# Patient Record
Sex: MALE | Race: BLACK OR AFRICAN AMERICAN | NOT HISPANIC OR LATINO
[De-identification: names, ages, dates, MRNs, and addresses within clinical notes are randomized per-mention and may not be internally consistent; named-entity substitution may affect disease eponyms.]

---

## 2023-01-10 ENCOUNTER — APPOINTMENT (OUTPATIENT)
Dept: UROLOGY | Facility: CLINIC | Age: 58
End: 2023-01-10
Payer: COMMERCIAL

## 2023-01-10 VITALS
TEMPERATURE: 97.8 F | HEIGHT: 68 IN | BODY MASS INDEX: 28.49 KG/M2 | WEIGHT: 188 LBS | RESPIRATION RATE: 16 BRPM | OXYGEN SATURATION: 97 % | HEART RATE: 68 BPM | DIASTOLIC BLOOD PRESSURE: 83 MMHG | SYSTOLIC BLOOD PRESSURE: 150 MMHG

## 2023-01-10 DIAGNOSIS — Z12.5 ENCOUNTER FOR SCREENING FOR MALIGNANT NEOPLASM OF PROSTATE: ICD-10-CM

## 2023-01-10 PROCEDURE — 51798 US URINE CAPACITY MEASURE: CPT

## 2023-01-10 PROCEDURE — 99204 OFFICE O/P NEW MOD 45 MIN: CPT

## 2023-01-10 NOTE — LETTER BODY
[Dear  ___] : Dear  [unfilled], [Courtesy Letter:] : I had the pleasure of seeing your patient, [unfilled], in my office today. [Please see my note below.] : Please see my note below. [Consult Closing:] : Thank you very much for allowing me to participate in the care of this patient.  If you have any questions, please do not hesitate to contact me. [Sincerely,] : Sincerely, [FreeTextEntry3] : Jewell Monique MD\par Director of Robotic Education\par [facility]\par \par [email or web address]\par [phone] ([place])\par [phone]  ([facility])

## 2023-01-10 NOTE — HISTORY OF PRESENT ILLNESS
[FreeTextEntry1] : Patient Name: Howard Heath\par Date of Birth: [birth year]\par Contact Number: [phone]\par ------------------------------------------------------------------------------\par Date of Initial Visit: 1/10/23\par Referring Provider/PCP: Dr. Tera Antonio\par ------------------------------------------------------------------------------\par \par CC: nocturia\par \par HPI: 57 year old with nocturia. Patient reports over the past year or so had onset of nocturia, gradually increased to 3x/night. Usually in second half of the night. Patient reports his sleep has been poor lately. The initial void of the night will wake him from sleep, but then difficult for him to go back to sleep in general and has multiple other voids. Patient reports heavy snoring. He is being worked-up for a sleep study. No major cardiac issues, just high blood pressure. Patient reports drinks a lot of juice and does this up until bedtime. \par \par During the day patient denies any issues. No frequency or urgency. Good stream. No straining. Feels like empties bladder completely. \par \par Patient reports his PCP did an ultrasound at his PCP last year and was told "seed" in his kidney, and was not concerned.\par \par Patient is unsure if he has had PSA screening in the past. Patient reports family history of prostate cancer - multiple uncles with prostate cancer - treated with surgery, at least one still alive, unsure if other  from unrelated causes.\par \par No issues with erections.\par \par IPSS 3 (nocturia) QOL 3\par CAMI 21\par \par PVR 87\par \par PMH: HTN\par PSH: inguinal hernia repair with mesh (open, at age 23), ACL repair, cyst removal\par Family History: prostate cancer in uncles\par Social: , never smoker tobacco, marijuana in the past and occasional use, alcohol 6 pack on weekend, + cocaine use on occasion\par Allergies: NKDA\par ROS: no fevers or chills, no weight loss

## 2023-01-10 NOTE — PHYSICAL EXAM
[General Appearance - Well Developed] : well developed [General Appearance - Well Nourished] : well nourished [Normal Appearance] : normal appearance [Well Groomed] : well groomed [General Appearance - In No Acute Distress] : no acute distress [Edema] : no peripheral edema [] : no respiratory distress [Respiration, Rhythm And Depth] : normal respiratory rhythm and effort [Exaggerated Use Of Accessory Muscles For Inspiration] : no accessory muscle use [Abdomen Soft] : soft [Abdomen Tenderness] : non-tender [Costovertebral Angle Tenderness] : no ~M costovertebral angle tenderness [Urethral Meatus] : meatus normal [Penis Abnormality] : normal uncircumcised penis [Urinary Bladder Findings] : the bladder was normal on palpation [Testes Tenderness] : no tenderness of the testes [Rectal Exam - Rectum] : digital rectal exam was normal [Prostate Tenderness] : the prostate was not tender [No Prostate Nodules] : no prostate nodules [Prostate Size ___ (0-4)] : prostate size [unfilled] (scale: 0-4) [Normal Station and Gait] : the gait and station were normal for the patient's age

## 2023-01-12 ENCOUNTER — NON-APPOINTMENT (OUTPATIENT)
Age: 58
End: 2023-01-12

## 2023-01-13 LAB
APPEARANCE: CLEAR
BACTERIA UR CULT: NORMAL
BACTERIA: NEGATIVE
BILIRUBIN URINE: NEGATIVE
BLOOD URINE: NEGATIVE
COLOR: NORMAL
GLUCOSE QUALITATIVE U: NEGATIVE
HYALINE CASTS: 0 /LPF
KETONES URINE: NEGATIVE
LEUKOCYTE ESTERASE URINE: NEGATIVE
MICROSCOPIC-UA: NORMAL
NITRITE URINE: NEGATIVE
PH URINE: 6.5
PROTEIN URINE: NEGATIVE
PSA FREE FLD-MCNC: 30 %
PSA FREE SERPL-MCNC: 0.43 NG/ML
PSA SERPL-MCNC: 1.43 NG/ML
RED BLOOD CELLS URINE: 2 /HPF
SPECIFIC GRAVITY URINE: 1.01
SQUAMOUS EPITHELIAL CELLS: 0 /HPF
UROBILINOGEN URINE: NORMAL
WHITE BLOOD CELLS URINE: 0 /HPF

## 2023-02-07 ENCOUNTER — APPOINTMENT (OUTPATIENT)
Dept: UROLOGY | Facility: CLINIC | Age: 58
End: 2023-02-07
Payer: COMMERCIAL

## 2023-02-07 PROCEDURE — 99442: CPT

## 2023-02-07 NOTE — HISTORY OF PRESENT ILLNESS
[Medical Office: ([facility])___] : at the medical office located in  [Verbal consent obtained from patient] : the patient, [unfilled] [FreeTextEntry1] : Patient Name: Howard Heath\par Date of Birth: [birth year]\par Contact Number: [phone]\par ------------------------------------------------------------------------------\par Date of Initial Visit: 1/10/23\par Referring Provider/PCP: Dr. Tera Antonio\par ------------------------------------------------------------------------------\par \par Initial HPI 23:\par \par CC: nocturia\par \par HPI: 57 year old with nocturia. Patient reports over the past year or so had onset of nocturia, gradually increased to 3x/night. Usually in second half of the night. Patient reports his sleep has been poor lately. The initial void of the night will wake him from sleep, but then difficult for him to go back to sleep in general and has multiple other voids. Patient reports heavy snoring. He is being worked-up for a sleep study. No major cardiac issues, just high blood pressure. Patient reports drinks a lot of juice and does this up until bedtime. \par \par During the day patient denies any issues. No frequency or urgency. Good stream. No straining. Feels like empties bladder completely. \par \par Patient reports his PCP did an ultrasound at his PCP last year and was told "seed" in his kidney, and was not concerned.\par \par Patient is unsure if he has had PSA screening in the past. Patient reports family history of prostate cancer - multiple uncles with prostate cancer - treated with surgery, at least one still alive, unsure if other  from unrelated causes.\par \par No issues with erections.\par \par IPSS 3 (nocturia) QOL 3\par CAMI 21\par \par PVR 87\par ------------------------------------------------------------------------------\par \par Interval History 23:\par \par Renal US 3/26/21: right kidney 6.4mm non obstructing right renal calculus. No masses, hydronephrosis, or cysts bilaterally.\par \par PSA 3/17/22: 1.0\par \par Labs from visit:\par UA wnl, urine culture no growth, PSA 1.4\par \par Underwent repeat renal US 23: non obstructing right mid renal calculi 0.8cm. PVR 38. Prostate 42cc. Bilateral ureteral jets.\par \par Patient reports he is asymptomatic, no N/v, fevers, chills, flank pain.\par \par \par ------------------------------------------------------------------------------\par PMH: HTN\par PSH: inguinal hernia repair with mesh (open, at age 23), ACL repair, cyst removal\par Family History: prostate cancer in uncles\par Social: , never smoker tobacco, marijuana in the past and occasional use, alcohol 6 pack on weekend, + cocaine use on occasion\par Allergies: NKDA\par ROS: no fevers or chills, no flank pain, no N/V\par \par

## 2023-02-07 NOTE — ASSESSMENT
[FreeTextEntry1] : 57 year old with 8mm right mid renal calculi. Non-obstructing. We discussed observation and intervention,a nd the risks and benefits of each approach. We discussed that while the stone is not currently obstructing, given size and location, may become obstructing and low likelihood of passage with 8mm stone. We discussed proceed with non-con CT to better delineate size and location to aid in decision making. Will plan for follow-up in person after imaging to discuss next steps. Patient going on vacation next week, will follow-up after. Given signs and symptoms for which to seek emergency attention. Patient in agreement with plan.\par \par - Ct stone hunt\par - f/u after imaging

## 2023-03-29 ENCOUNTER — APPOINTMENT (OUTPATIENT)
Dept: UROLOGY | Facility: CLINIC | Age: 58
End: 2023-03-29
Payer: COMMERCIAL

## 2023-03-29 VITALS — DIASTOLIC BLOOD PRESSURE: 77 MMHG | SYSTOLIC BLOOD PRESSURE: 128 MMHG | TEMPERATURE: 98 F | HEART RATE: 80 BPM

## 2023-03-29 DIAGNOSIS — N28.1 CYST OF KIDNEY, ACQUIRED: ICD-10-CM

## 2023-03-29 PROCEDURE — 51798 US URINE CAPACITY MEASURE: CPT

## 2023-03-29 PROCEDURE — 99215 OFFICE O/P EST HI 40 MIN: CPT

## 2023-03-29 NOTE — LETTER BODY
[Dear  ___] : Dear  [unfilled], [Courtesy Letter:] : I had the pleasure of seeing your patient, [unfilled], in my office today. [Please see my note below.] : Please see my note below. [Consult Closing:] : Thank you very much for allowing me to participate in the care of this patient.  If you have any questions, please do not hesitate to contact me. [Sincerely,] : Sincerely, [FreeTextEntry3] : Jewell Monique MD\par Director of Robotic Education\par [facility] at [facility]\par \par [email or web address]\par [phone] ([place])\par [phone]  ([facility])

## 2023-03-29 NOTE — PHYSICAL EXAM
[General Appearance - Well Developed] : well developed [General Appearance - Well Nourished] : well nourished [Normal Appearance] : normal appearance [Well Groomed] : well groomed [General Appearance - In No Acute Distress] : no acute distress [Abdomen Soft] : soft [Abdomen Tenderness] : non-tender [Costovertebral Angle Tenderness] : no ~M costovertebral angle tenderness [Edema] : no peripheral edema [] : no respiratory distress [Respiration, Rhythm And Depth] : normal respiratory rhythm and effort [Exaggerated Use Of Accessory Muscles For Inspiration] : no accessory muscle use [Normal Station and Gait] : the gait and station were normal for the patient's age

## 2023-03-29 NOTE — HISTORY OF PRESENT ILLNESS
[FreeTextEntry1] : Patient Name: Howard Heath\par Date of Birth: [birth year]\par Contact Number: [phone]\par ------------------------------------------------------------------------------\par Date of Initial Visit: 1/10/23\par Referring Provider/PCP: Dr. Tera Antonio (f. [phone])\par ------------------------------------------------------------------------------\par \par Initial HPI 23:\par \par CC: nocturia\par \par HPI: 57 year old with nocturia. Patient reports over the past year or so had onset of nocturia, gradually increased to 3x/night. Usually in second half of the night. Patient reports his sleep has been poor lately. The initial void of the night will wake him from sleep, but then difficult for him to go back to sleep in general and has multiple other voids. Patient reports heavy snoring. He is being worked-up for a sleep study. No major cardiac issues, just high blood pressure. Patient reports drinks a lot of juice and does this up until bedtime. \par \par During the day patient denies any issues. No frequency or urgency. Good stream. No straining. Feels like empties bladder completely. \par \par Patient reports his PCP did an ultrasound at his PCP last year and was told "seed" in his kidney, and was not concerned.\par \par Patient is unsure if he has had PSA screening in the past. Patient reports family history of prostate cancer - multiple uncles with prostate cancer - treated with surgery, at least one still alive, unsure if other  from unrelated causes.\par \par No issues with erections.\par \par IPSS 3 (nocturia) QOL 3\par CAMI 21\par \par PVR 87\par ------------------------------------------------------------------------------\par \par Interval History 23:\par \par Renal US 3/26/21: right kidney 6.4mm non obstructing right renal calculus. No masses, hydronephrosis, or cysts bilaterally.\par \par PSA 3/17/22: 1.0\par \par Labs from visit:\par UA wnl, urine culture no growth, PSA 1.4\par \par Underwent repeat renal US 23: non obstructing right mid renal calculi 0.8cm. PVR 38. Prostate 42cc. Bilateral ureteral jets.\par \par Patient reports he is asymptomatic, no N/v, fevers, chills, flank pain.\par ------------------------------------------------------------------------------\par \par Interval History 3/29/23:\par \par Non-con CT 3/20/23: 1. Several nonobstructing calculi in the right kidney measuring up to 5 mm and 2 nonobstructing calculi in the left kidney measuring up to 3 mm.\par 2. Cyst measuring 8 mm in the mid left kidney containing few thick septations. For further evaluation, MR examination of abdomen with and without intravenous gadolinium may be helpful.\par 3. Mildly enlarged prostate gland. - 39g\par \par Nocturia somewhat improved given patient reports he is on furlough and his patterns have changed, but he has not limited fluid much and has not done MILLER work-up yet, but will do after his vacation.\par \par IPSS 2, mixed\par CAMI 16\par PVR 91 - random bladder scan, patient voided earlier in AM and did not need to void\par Uroflow - patient voided prior, will do at next visit\par \par Imaging:\par CT non-con [facility] 3/20/23: KIDNEYS: The kidneys are normal in size, shape, and position.  There are no renal calculi or hydronephrosis.\par \par There is a 5 x 4 mm nonobstructing calculus in the mid right kidney. Density measurement corresponds to 747 Hounsfield units.\par There is also a 4 x 4 mm nonobstructing calculus in mid right kidney and 2 adjacent 2 mm nonobstructing calculi in the upper right kidney.\par There is 2 mm nonobstructing calculus in the upper left kidney and 3 mm nonobstructing calculus in the lower left kidney.\par \par There is 8mm low-attenuation lesion in the mid left kidney containing few thick septations. For further evaluation, MR examination of the abdomen with and without intravenous gadolinium may be helpful.\par \par URETERS: Unremarkable.\par \par URINARY BLADDER: Unremarkable.\par \par REPRODUCTIVE ORGANS: The prostate gland measures 4.9 x 3.9 x 4.2 cm corresponding to 39 g.\par \par ------------------------------------------------------------------------------\par PMH: HTN\par PSH: inguinal hernia repair with mesh (open, at age 23), ACL repair, cyst removal\par Family History: prostate cancer in uncles\par Social: , never smoker tobacco, marijuana in the past and occasional use, alcohol 6 pack on weekend, + cocaine use on occasion\par Allergies: NKDA\par ROS: no fevers or chills, no flank pain, no N/V

## 2023-03-29 NOTE — ASSESSMENT
[FreeTextEntry1] : 57 year old with  nocturia, nephrolithiasis, and renal cyst. \par \par LUTS/nocturia: No significant daytime LUTS. Random bladder scan today 90 (patient voided prior to coming in). Patient does reports significant snoring and undergoing work-up for MILLER with PCP, says will do after vacation. Discussed role of MILLER in nocturia. Patient also reports drinks most of his fluids in PM and before bed. Discussed abstaining from fluids 3-4 prior to bedtime as well, which patient reports has not really done. With regard to his PVR, discussed prostatic component to his nocturia and considering initiation of Flomax, however, patient would like to hold off at this time.\par \par Nephrolithiasis: right kidney has 5mm stone mid pole, 4mm stone midpole, 2 2mm stones upper pole, all non-obstructing; left kidney 2mm upper pole and 3mm lower pole. Assessment:  \par \par We discussed the management of kidney stones with the patient. There are several options, including surveillance (no treatment), shock wave lithotripsy, ureteroscopy, and percutaneous stone removal. \par \par Surveillance:  \par We can continue to watch the stone(s) over the next year or more. However, I explained that there is always a risk that the stone could get bigger in size or become symptomatic (pain, bleeding, urinary tract infections, kidney dysfunction from obstruction). While there is limited data examining which patients will eventually need treatment for stones that are asymptomatic, some studies suggest that 20-50% of patients will eventually seek treatment or pass a stone within 5 years. That percentage increases as the size of the stone increases. \par \par Shock Wave Lithotripsy (SWL):  \par This is the least invasive form of surgery for stones and an excellent option for select stones. For ureteral stones, SWL is limited to the upper ureter. I explained how the procedure is performed and the concept behind shock waves. Procedural success is dependent on several stone and environmental factors such as the stone composition or density on CT, the presence or absence of hydronephrosis, size of the stone, stone location, and skin-to-stone distance on CT scan (patient’s body habitus). For these reasons, not all stones/patients are good candidates for SWL. The chances of being stone free after SWL are often much lower compared to other modalities, such as ureteroscopy, except in select cases where stone-free rates are comparable. Therefore, there is a risk that the patient would need subsequent procedures to render them stone-free. Since this is a non-invasive procedure, we are relying on the kidney to spontaneously pass the resultant stone fragments. At the same time, this procedure does carry some perioperative risks, mainly bleeding and infection, as well as the small risk of developing obstruction due to passage of stone fragments, which could require urgent placement of a double-J ureteral stent or nephrostomy tube. \par \par Ureteroscopy:  \par I explained the technique in detail and how it is performed. Complete stone free rates (no residual fragments of any size) approach 90% for ureteral stones and likely range from 50-60% for renal stones. Very commonly, a ureteral stent is left in place at the conclusion of the procedure, but only if needed. I explained that if a stent is placed, it would need to be removed either cystoscopically under local anesthesia or it may have a string left externally through the urethra for removal in a few days after the procedure. Risks of ureteroscopy include, but are not limited to, bleeding, infection, injury to the bladder or ureter, ureteral perforation, ureteral stricture, residual fragments leading to subsequent symptoms or secondary procedures, and other risks involved with general anesthesia. There is also the risk that the procedure needs to be staged into more than one session based on the patient's internal anatomy and the size of the stone(s). Finally, dilation of the ureter and/or ureteral stent placement prior to definitive ureteroscopy may be necessary to achieve ureteral access safely in up to 5% of patients, particularly those who have not been previously instrumented. \par \par Percutaneous nephrolithotomy (PCNL):  \par PCNL is generally reserved for large and/or complex stones, including staghorn calculi, lower pole renal stones > 1 cm, or stones in complex renal anatomy. It has the highest rate of stone clearance but is more invasive. It requires an external access site directly into the kidney through the back in order to remove the stones. I generally perform this surgery in one step in the operating room (the patient will have their kidney access obtained at the same time as the stone removal), thus avoiding the need for multiple procedures (e.g., one procedure to have the kidney access tube placed by interventional radiology and a second procedure in the operating room for the stone removal). Most patients have a ureteral stent following the procedure, which is typically removed in the office at follow up in 1-2 weeks. Alternatively, some patients will be left with a nephrostomy tube (external tube into the kidney). Due to its more invasive nature, PCNL does have higher risks than ureteroscopy and SWL. Specific risks include bleeding requiring transfusion, fevers, sepsis, and major injury to surrounding organs (pleura or bowel). Finally, multiple procedures and/or staged ureteroscopy with laser lithotripsy after PCNL may be required to clear all stone fragments. \par \par \par Renal cyst: 8mm lesion mid left kidney with thick septations: plan for MR urogram to better evaluate\par \par \par PSA screening: \par Patient also has family history of prostate cancer and is . PSA 1.43 1/12/2023. Recheck 1/2024\par \par \par - MR renal mass protocol\par - MILLER testing underway with PCP\par - monitor PVR, wants to hold off on initiating Flomax for now, Uroflow and PVR next visit\par - PSA 1/2024\par - stone management: patient would like to think about options and discuss with his wife. He will return after MRI and we will discuss imaging and patient will decide on management at that time. In interim we discuss increasing water intake, low salt diet. We discussed warning signs for which he should seek emergency attention (and let me know) including fevers, chills, N/V/inability to tolerate po, intractable pain\par - f/u after MRI\par \par \par  \par

## 2023-06-01 ENCOUNTER — TRANSCRIPTION ENCOUNTER (OUTPATIENT)
Age: 58
End: 2023-06-01

## 2023-06-13 ENCOUNTER — APPOINTMENT (OUTPATIENT)
Dept: UROLOGY | Facility: CLINIC | Age: 58
End: 2023-06-13
Payer: COMMERCIAL

## 2023-06-13 VITALS
RESPIRATION RATE: 16 BRPM | WEIGHT: 188 LBS | HEIGHT: 68 IN | HEART RATE: 62 BPM | SYSTOLIC BLOOD PRESSURE: 145 MMHG | BODY MASS INDEX: 28.49 KG/M2 | OXYGEN SATURATION: 98 % | DIASTOLIC BLOOD PRESSURE: 87 MMHG

## 2023-06-13 PROCEDURE — 99214 OFFICE O/P EST MOD 30 MIN: CPT

## 2023-06-13 PROCEDURE — 51736 URINE FLOW MEASUREMENT: CPT

## 2023-06-13 PROCEDURE — 51798 US URINE CAPACITY MEASURE: CPT

## 2023-06-13 NOTE — PHYSICAL EXAM
[General Appearance - Well Developed] : well developed [General Appearance - Well Nourished] : well nourished [Normal Appearance] : normal appearance [Well Groomed] : well groomed [General Appearance - In No Acute Distress] : no acute distress [Abdomen Soft] : soft [Abdomen Tenderness] : non-tender [Costovertebral Angle Tenderness] : no ~M costovertebral angle tenderness [FreeTextEntry1] : no evidence significant hernia on exam

## 2023-06-13 NOTE — ASSESSMENT
[FreeTextEntry1] : 57 year old with nocturia, nephrolithiasis, and renal cyst. \par \par LUTS/nocturia: No significant daytime LUTS. Patient does reports significant snoring and undergoing work-up for MILLER but has still not done. Discussed role of MILLER in nocturia. Patient also reports drinks most of his fluids in PM and before bed. Discussed abstaining from fluids 3-4 prior to bedtime as well, which patient reports has helped when he does. PVR 0 today. Will continue conservative measures and plan for MILLER eval.\par \par Nephrolithiasis: right kidney has 5mm stone mid pole, 4mm stone midpole, 2 2mm stones upper pole, all non-obstructing; left kidney 2mm upper pole and 3mm lower pole. We discussed the management of kidney stones with the patient. There are several options, including surveillance (no treatment), shock wave lithotripsy, ureteroscopy, and percutaneous stone removal. We discussed detail of each approach at last visit and again today. Patient would like to monitor for now. I explained that there is always a risk that the stone could get bigger in size or become symptomatic (pain, bleeding, urinary tract infections, kidney dysfunction from obstruction). While there is limited data examining which patients will eventually need treatment for stones that are asymptomatic, some studies suggest that 20-50% of patients will eventually seek treatment or pass a stone within 5 years. That percentage increases as the size of the stone increases. \par \par Renal cyst: 8mm lesion mid left kidney with thick septations. Underwent MR for better evaluation: Bilateral renal cysts. The cyst noted on the CT examination represents a simple cyst. No follow-up needed.\par \par PSA screening: \par Patient also has family history of prostate cancer and is . PSA 1.43 1/12/2023. Recheck 1/2024\par \par \par - MILLER testing - patient has not followed up with pulm yet, but will do\par - PSA 1/2024\par - stone management: patient would like to monitor for now. In interim we discuss increasing water intake, low salt diet. We discussed warning signs for which he should seek emergency attention (and let me know) including fevers, chills, N/V/inability to tolerate po, intractable pain; not interested in 24 hour urine at this time\par - will f/u PCP re hemangioma - gave patient copy of report and faxed results to PCP\par - no further imaging re renal cyst\par - surveillance US for stones Sept/Oct\par - gave names for general surgeon to evaluate for hernia

## 2023-06-13 NOTE — LETTER BODY
[Dear  ___] : Dear  [unfilled], [Courtesy Letter:] : I had the pleasure of seeing your patient, [unfilled], in my office today. [Please see my note below.] : Please see my note below. [Consult Closing:] : Thank you very much for allowing me to participate in the care of this patient.  If you have any questions, please do not hesitate to contact me. [Sincerely,] : Sincerely, [FreeTextEntry3] : Jewell Monique MD\par Director of Robotic Education\par The [facility] at [facility]\par \par [email or web address]\par [phone] ([place])\par [phone]  ([facility])

## 2023-06-13 NOTE — HISTORY OF PRESENT ILLNESS
[FreeTextEntry1] : Patient Name: Howard Heath\par Date of Birth: [birth year]\par Contact Number: [phone]\par ------------------------------------------------------------------------------\par Date of Initial Visit: 1/10/23\par Referring Provider/PCP: Dr. Tera Antonio (f. [phone])\par ------------------------------------------------------------------------------\par \par Initial HPI 23:\par \par CC: nocturia\par \par HPI: 57 year old with nocturia. Patient reports over the past year or so had onset of nocturia, gradually increased to 3x/night. Usually in second half of the night. Patient reports his sleep has been poor lately. The initial void of the night will wake him from sleep, but then difficult for him to go back to sleep in general and has multiple other voids. Patient reports heavy snoring. He is being worked-up for a sleep study. No major cardiac issues, just high blood pressure. Patient reports drinks a lot of juice and does this up until bedtime. \par \par During the day patient denies any issues. No frequency or urgency. Good stream. No straining. Feels like empties bladder completely. \par \par Patient reports his PCP did an ultrasound at his PCP last year and was told "seed" in his kidney, and was not concerned.\par \par Patient is unsure if he has had PSA screening in the past. Patient reports family history of prostate cancer - multiple uncles with prostate cancer - treated with surgery, at least one still alive, unsure if other  from unrelated causes.\par \par No issues with erections.\par \par IPSS 3 (nocturia) QOL 3\par CAMI 21\par \par PVR 87\par ------------------------------------------------------------------------------\par \par Interval History 23:\par \par Renal US 3/26/21: right kidney 6.4mm non obstructing right renal calculus. No masses, hydronephrosis, or cysts bilaterally.\par \par PSA 3/17/22: 1.0\par \par Labs from visit:\par UA wnl, urine culture no growth, PSA 1.4\par \par Underwent repeat renal US 23: non obstructing right mid renal calculi 0.8cm. PVR 38. Prostate 42cc. Bilateral ureteral jets.\par \par Patient reports he is asymptomatic, no N/v, fevers, chills, flank pain.\par ------------------------------------------------------------------------------\par \par Interval History 3/29/23:\par \par Non-con CT 3/20/23: 1. Several nonobstructing calculi in the right kidney measuring up to 5 mm and 2 nonobstructing calculi in the left kidney measuring up to 3 mm.\par 2. Cyst measuring 8 mm in the mid left kidney containing few thick septations. For further evaluation, MR examination of abdomen with and without intravenous gadolinium may be helpful.\par 3. Mildly enlarged prostate gland. - 39g\par \par Nocturia somewhat improved given patient reports he is on furlough and his patterns have changed, but he has not limited fluid much and has not done MILLER work-up yet, but will do after his vacation.\par \par IPSS 2, mixed\par CAMI 16\par PVR 91 - random bladder scan, patient voided earlier in AM and did not need to void\par Uroflow - patient voided prior, will do at next visit\par \par Imaging:\par CT non-con [facility] 3/20/23: KIDNEYS: The kidneys are normal in size, shape, and position. There are no renal calculi or hydronephrosis.\par \par There is a 5 x 4 mm nonobstructing calculus in the mid right kidney. Density measurement corresponds to 747 Hounsfield units.\par There is also a 4 x 4 mm nonobstructing calculus in mid right kidney and 2 adjacent 2 mm nonobstructing calculi in the upper right kidney.\par There is 2 mm nonobstructing calculus in the upper left kidney and 3 mm nonobstructing calculus in the lower left kidney.\par \par There is 8mm low-attenuation lesion in the mid left kidney containing few thick septations. For further evaluation, MR examination of the abdomen with and without intravenous gadolinium may be helpful.\par \par URETERS: Unremarkable.\par \par URINARY BLADDER: Unremarkable.\par \par REPRODUCTIVE ORGANS: The prostate gland measures 4.9 x 3.9 x 4.2 cm corresponding to 39 g.\par ----------------------------------------------------------------------------------------------------------------------------------------\par Interval History (2023):\par \par Nocturia: Patient reports got better when withheld fluid but when doesn’t goes up to 3x, not overly bothersome. Daytime urination not an issue, denies any straining. Feels like emptying bladder. Patient was supposed to have MILLER eval with pulmonology but has not followed up.\par \par Underwent MRI for better eval of renal cyst: 1.  Bilateral renal cysts. The cyst noted on the CT examination represents a simple cyst. No follow-up needed. 2.  Small left hepatic hemangioma. (full read below).\par \par Also reports strain in left inguinal region, started after lifting weights.\par \par IPSS 9, QOL 3\par CAMI 16\par Uroflow Qmax 8.7, but only voided 102.6ml, good curve\par PVR 0\par \par EXAM:  MRI ABDOMEN WITHOUT AND WITH CONTRAST 23\par \par FINDINGS: LOWER CHEST: The heart size is normal. There is no pleural effusion.\par \par LIVER: The liver is normal in size. No significant fatty infiltration is noted. There is a 1.2 x 0.7 cm focal lesion in segment 2/4A showing low signal intensity on T1-weighted images, high signal intensity on T2-weighted images with peripheral nodular centripetal enhancement diagnostic of a hemangioma. The hepatic and portal veins are patent.\par \par GALLBLADDER/BILIARY TREE: There is no biliary tree dilatation. The gallbladder is of normal size without wall thickening, pericholecystic fluid or large stones.\par \par PANCREAS: There is no mass or pancreatic ductal dilatation. \par \par SPLEEN: The spleen is normal size and signal intensities. There is no mass.  \par \par ADRENAL GLANDS: The adrenal glands are normal in size. There is no nodule. \par \par KIDNEYS/URETERS: The kidneys are normal in size and position. Arising from the medial aspect of the junction of the upper to mid-level of the left kidney is a simple cyst measuring 1 cm corresponding to the cyst seen on the CT study. A few additional tiny cysts are scattered in both kidneys. There is no suspicious solid mass or hydronephrosis. Note that MRI has low sensitivity in detecting small stones. On the prior CT examination, there were bilateral renal stones. Please refer to the CT report.\par \par GI TRACT: There is no bowel dilatation or obstruction. \par \par BLOOD VESSELS: The aorta and IVC are normal in size.\par \par LYMPH NODES: There are no enlarged lymph nodes.  \par \par ASCITES: There is no ascites. \par \par BONES: Intact. There is mild dextroscoliosis of the lower thoracic spine.\par \par OTHER: A small umbilical hernia containing fat is present.\par \par IMPRESSION:\par \par 1.  Bilateral renal cysts. The cyst noted on the CT examination represents a simple cyst. No follow-up needed.\par 2.  Small left hepatic hemangioma.\par \par \par ---------------------------------------------------------------------------------------------------------------------------------------- \par \par PMH: HTN\par PSH: inguinal hernia repair with mesh (open, at age 23), ACL repair, cyst removal\par Family History: prostate cancer in uncles\par Social: , never smoker tobacco, marijuana in the past and occasional use, alcohol 6 pack on weekend, + cocaine use on occasion\par Allergies: NKDA\par ROS: no fevers or chills, no flank pain, no N/V \par ---------------------------------------------------------------------------------------------------------------------------------------- \par \par Labs:\par PSA Trend:\par 3/17/22: 1.0\par 23: 1.43

## 2023-07-10 NOTE — LETTER BODY
[de-identified] : GENERAL APPEARANCE OF PATIENT IS WELL DEVELOPED, WELL NOURISHED, BODY HABITUS NORMAL, WELL GROOMED, NO DEFORMITIES NOTED. \par Head - Atraumatic and Normocephalic \par Eyes, Nose, and Throat: External inspection of ears and nose are normal overall without scars, lesions, or masses noted. Assessment of hearing is normal\par Neck-Examination of neck shows no masses, overall appearance is normal, neck is symmetric, tracheal position is midline, no crepitus is noted. Examination of thyroid shows no enlargement, tenderness or masses\par Respiratory- Assessment of respiratory effort shows no intercostal retractions, no use of accessory muscles, unlabored breathing, and normal diaphragmatic movement.\par Cardiovascular- Examination of extremities show no edema or varicosities\par Musculoskeletal. Examination of gait is not antalgic and station is normal\par Inspection and palpation of digits and nails shows no clubbing, cyanosis, nodules, drainage, fluctuance, petechiae\par \par • Spine – inspection and palpation shows no misalignment, asymmetry, crepitation, defects, tenderness, masses, effusions. ROM is normal without crepitation or contracture. No instability or subluxation or laxity is noted. No abnormal movements.\par \par \par • Neck- inspection and palpation shows no misalignment, asymmetry, crepitation, defects, tenderness, masses, effusions. ROM is normal without crepitation or contracture. No instability or subluxation or laxity is noted. No abnormal movements.\par \par \par • RUE- inspection and palpation shows no misalignment, asymmetry, crepitation, defects, tenderness, masses, effusions. ROM is normal without crepitation or contracture. No instability or subluxation or laxity is noted. No abnormal movements.\par \par \par • LUE- inspection and palpation shows no misalignment, asymmetry, crepitation, defects, tenderness, masses, effusions. ROM is normal without crepitation or contracture. No instability or subluxation or laxity is noted. No abnormal movements.\par \par \par • RLE- inspection and palpation shows no misalignment, asymmetry, crepitation, defects, tenderness, masses, effusions. ROM is normal without crepitation or contracture. No instability or subluxation or laxity is noted. No abnormal movements.\par \par \par • LLE- allodynia and hyperalgesia 2/3 down the left leg. Multiple incisions on the left foot and ankle. Incisions are healing well no signs of infection. Swelling on the left foot. \par \par \par • Skin- Inspection of skin and subcutaneous tissue shows no rashes, lesions or ulcers. Palpation of skin and subcutaneous tissue shows no rashes, no indurations, subcutaneous nodules or tightening.\par \par \par • Abdomen- Nontender\par \par \par • Neurologic- CN 2-12 are grossly intact. No sensory or motor deficits in the upper and lower extremities. Adequate strength in upper and lower extremities \par \par \par • Psychiatric- Patient’s judgment and insight are intact. Oriented to time, place and person. Recent and remote memory intact.\par  [Dear  ___] : Dear  [unfilled], [Courtesy Letter:] : I had the pleasure of seeing your patient, [unfilled], in my office today. [Please see my note below.] : Please see my note below. [Consult Closing:] : Thank you very much for allowing me to participate in the care of this patient.  If you have any questions, please do not hesitate to contact me. [Sincerely,] : Sincerely, [FreeTextEntry3] : Jewell Monique MD\par Director of Robotic Education\par [facility]\par \par [email or web address]\par [phone] ([place])\par [phone]  ([facility])

## 2023-09-12 ENCOUNTER — APPOINTMENT (OUTPATIENT)
Dept: UROLOGY | Facility: CLINIC | Age: 58
End: 2023-09-12
Payer: COMMERCIAL

## 2023-09-12 VITALS — TEMPERATURE: 98.1 F | SYSTOLIC BLOOD PRESSURE: 157 MMHG | DIASTOLIC BLOOD PRESSURE: 93 MMHG | HEART RATE: 63 BPM

## 2023-09-12 PROCEDURE — 99214 OFFICE O/P EST MOD 30 MIN: CPT

## 2023-09-12 PROCEDURE — 76775 US EXAM ABDO BACK WALL LIM: CPT

## 2023-10-16 ENCOUNTER — APPOINTMENT (OUTPATIENT)
Dept: BARIATRICS | Facility: CLINIC | Age: 58
End: 2023-10-16
Payer: COMMERCIAL

## 2023-10-16 VITALS
HEART RATE: 62 BPM | TEMPERATURE: 97.6 F | BODY MASS INDEX: 28.04 KG/M2 | SYSTOLIC BLOOD PRESSURE: 174 MMHG | DIASTOLIC BLOOD PRESSURE: 98 MMHG | WEIGHT: 185 LBS | OXYGEN SATURATION: 97 % | HEIGHT: 68 IN

## 2023-10-16 DIAGNOSIS — Z82.49 FAMILY HISTORY OF ISCHEMIC HEART DISEASE AND OTHER DISEASES OF THE CIRCULATORY SYSTEM: ICD-10-CM

## 2023-10-16 DIAGNOSIS — Z78.9 OTHER SPECIFIED HEALTH STATUS: ICD-10-CM

## 2023-10-16 DIAGNOSIS — I10 ESSENTIAL (PRIMARY) HYPERTENSION: ICD-10-CM

## 2023-10-16 DIAGNOSIS — K46.9 UNSPECIFIED ABDOMINAL HERNIA W/OUT OBSTRUCTION OR GANGRENE: ICD-10-CM

## 2023-10-16 PROCEDURE — 99203 OFFICE O/P NEW LOW 30 MIN: CPT

## 2023-10-17 ENCOUNTER — APPOINTMENT (OUTPATIENT)
Dept: UROLOGY | Facility: CLINIC | Age: 58
End: 2023-10-17
Payer: COMMERCIAL

## 2023-10-17 ENCOUNTER — APPOINTMENT (OUTPATIENT)
Dept: UROLOGY | Facility: CLINIC | Age: 58
End: 2023-10-17

## 2023-10-17 VITALS
SYSTOLIC BLOOD PRESSURE: 159 MMHG | DIASTOLIC BLOOD PRESSURE: 104 MMHG | HEART RATE: 71 BPM | OXYGEN SATURATION: 97 % | TEMPERATURE: 97.6 F

## 2023-10-17 DIAGNOSIS — N20.0 CALCULUS OF KIDNEY: ICD-10-CM

## 2023-10-17 PROCEDURE — 99213 OFFICE O/P EST LOW 20 MIN: CPT

## 2024-01-09 ENCOUNTER — APPOINTMENT (OUTPATIENT)
Dept: UROLOGY | Facility: CLINIC | Age: 59
End: 2024-01-09
Payer: COMMERCIAL

## 2024-01-09 VITALS — DIASTOLIC BLOOD PRESSURE: 75 MMHG | SYSTOLIC BLOOD PRESSURE: 135 MMHG | TEMPERATURE: 97.5 F | HEART RATE: 71 BPM

## 2024-01-09 PROCEDURE — 99214 OFFICE O/P EST MOD 30 MIN: CPT

## 2024-01-09 NOTE — ASSESSMENT
[FreeTextEntry1] : 58 year old with nocturia, nephrolithiasis, and renal cyst.  LUTS/nocturia: No significant daytime LUTS, improvement in nocturia with behavioral changes. Patient does reports significant snoring and plan for work-up for MILLER but has still not done. Discussed role of MILLER in nocturia. Will continue conservative measures and plan for MILLER eval.  Nephrolithiasis: bilateral nephrolithiasis - plan for repeat US in 3 months. We again discussed the management of kidney stones. There are several options, including surveillance (no treatment), shock wave lithotripsy, ureteroscopy, and percutaneous stone removal. We discussed detail of each approach at last visit and again today. Patient would like to monitor for now. I explained that there is always a risk that the stone could get bigger in size or become symptomatic (pain, bleeding, urinary tract infections, kidney dysfunction from obstruction). While there is limited data examining which patients will eventually need treatment for stones that are asymptomatic, some studies suggest that 20-50% of patients will eventually seek treatment or pass a stone within 5 years. That percentage increases as the size of the stone increases. Patient expressed understanding but will monitor and if growth at next US in 3 months he would likely like to pursue treatment. In interim, did rick and saw Jordyn Nowak NP: Emphasis on continuing adequate fluid intake, decreasing dietary sodium and oxalate foods. Increase fruits/vegetables for adequate citrate intake.  PSA screening: Patient also has family history of prostate cancer and is . PSA 1.43 1/12/2023. Recheck today.  Plan: - MILLER testing - patient has not followed up with pulm yet, but will do - PSA today - stone management: patient would like to monitor for now. In interim emphasis on continuing adequate fluid intake, decreasing dietary sodium and oxalate foods. Increase fruits/vegetables for adequate citrate intake.. We discussed warning signs for which he should seek emergency attention (and let me know) including fevers, chills, N/V/inability to tolerate po, intractable pain - gave patient info for new PCP - no further imaging re renal cyst - surveillance US for stones in 3 months - will call with PSA results and fu in 3 months for US

## 2024-01-09 NOTE — HISTORY OF PRESENT ILLNESS
[FreeTextEntry1] : Patient Name: Howard Heath Date of Birth: [birth year] Contact Number: [phone] ------------------------------------------------------------------------------ Date of Initial Visit: 1/10/23 Referring Provider/PCP: between PCPs ------------------------------------------------------------------------------ Initial HPI 23:  CC: nocturia  HPI: 57 year old with nocturia. Patient reports over the past year or so had onset of nocturia, gradually increased to 3x/night. Usually in second half of the night. Patient reports his sleep has been poor lately. The initial void of the night will wake him from sleep, but then difficult for him to go back to sleep in general and has multiple other voids. Patient reports heavy snoring. He is being worked-up for a sleep study. No major cardiac issues, just high blood pressure. Patient reports drinks a lot of juice and does this up until bedtime.  During the day patient denies any issues. No frequency or urgency. Good stream. No straining. Feels like empties bladder completely.  Patient reports his PCP did an ultrasound at his PCP last year and was told "seed" in his kidney, and was not concerned.  Patient is unsure if he has had PSA screening in the past. Patient reports family history of prostate cancer - multiple uncles with prostate cancer - treated with surgery, at least one still alive, unsure if other  from unrelated causes.  No issues with erections.  IPSS 3 (nocturia) QOL 3 CAMI 21  PVR 87 ------------------------------------------------------------------------------ Interval History 23:  Renal US 3/26/21: right kidney 6.4mm non obstructing right renal calculus. No masses, hydronephrosis, or cysts bilaterally.  PSA 3/17/22: 1.0  Labs from visit: UA wnl, urine culture no growth, PSA 1.4 Underwent repeat renal US 23: non obstructing right mid renal calculi 0.8cm. PVR 38. Prostate 42cc. Bilateral ureteral jets.  Patient reports he is asymptomatic, no N/v, fevers, chills, flank pain. ------------------------------------------------------------------------------ Interval History 3/29/23:  Non-con CT 3/20/23: 1. Several nonobstructing calculi in the right kidney measuring up to 5 mm and 2 nonobstructing calculi in the left kidney measuring up to 3 mm. 2. Cyst measuring 8 mm in the mid left kidney containing few thick septations. For further evaluation, MR examination of abdomen with and without intravenous gadolinium may be helpful. 3. Mildly enlarged prostate gland. - 39g  Nocturia somewhat improved given patient reports he is on furlough and his patterns have changed, but he has not limited fluid much and has not done MILLER work-up yet, but will do after his vacation.  IPSS 2, mixed CAMI 16  PVR 91 - random bladder scan, patient voided earlier in AM and did not need to void Uroflow - patient voided prior, will do at next visit ---------------------------------------------------------------------------------------------------------------------------------------- Interval History (2023):  Nocturia: Patient reports got better when withheld fluid but when doesn't goes up to 3x, not overly bothersome. Daytime urination not an issue, denies any straining. Feels like emptying bladder. Patient was supposed to have MILLER eval with pulmonology but has not followed up.  Underwent MRI for better eval of renal cyst: 1. Bilateral renal cysts. The cyst noted on the CT examination represents a simple cyst. No follow-up needed. 2. Small left hepatic hemangioma. (full read below).  Also reports strain in left inguinal region, started after lifting weights.  IPSS 9, QOL 3 CAMI 16 Uroflow Qmax 8.7, but only voided 102.6ml, good curve PVR 0 ---------------------------------------------------------------------------------------------------------------------------------------- Interval History (2023):  Nocturia: Patient reports not overly bothersome - depends on fluid intake before bed- when withheld fluids less an issue. Daytime urination not an issue, denies any straining. Feels like emptying bladder. Patient was supposed to have MILLER eval but still has not followed up.  Renal US today for stone surveillance: Bilateral echogenic foci visualized. Echogenic focus with distal shadow visualized in the mid pole right kidney measured 5.7 mm, another one without shadow visualized in the upper pole measured 2.7 mm. Echogenic focus visualized on the upper pole left kidney without shadow but with twinkle artifact measured 6.5 mm. A simple cyst measuring 1 cm visualized in the mid pole left kidney. Patient denies any flank pain, N/V  IPSS 3, QOL 3 CAMI 21 ---------------------------------------------------------------------------------------------------------------------------------------- Interval History (2024):  Saw Jordyn Nowak NP for litholink review: Results of 24-hour urine analysis (completed 2023) demonstrate: -Adequate urine volume: 2.43 L/day -Elevated urinary sodium: 200 mmol/day -Normal urinary calcium: 111 mg/day -Hyperoxaluria: 44 mg/day -Normal urinary citrate: 478 mg/day -PCR: 1.1 mg/kg/day -Normal urinary uric acid: 0.726 g/day -Urinary pH: 6.958  Patient reports had chronic cough, saw cards started on meds for high BP and will be seeing pulmonologist in near future, but doing better. Patient denies any flank pain, N/V. Reports at cardiologist they did US and they saw stones - he will have report sent here. Given asymptomatic would still like to observe at this time, but considering intervention in future - will reevaluate after next US.  With regards to urination, not currently on meds. Patient reports overall pleased with urination, not overly bothered - nocturia 1-2x/night. No significant frequency or urgency. Still hasn't had MILLER work-up but will discuss with pulmonologist when he sees.  Patient also reports has stopped using recreational cocaine.  Did not have PSA drawn  IPSS 6, QOL 3 CAMI 19 ---------------------------------------------------------------------------------------------------------------------------------------- PMH: HTN PSH: inguinal hernia repair with mesh (open, at age 23), ACL repair, cyst removal Family History: prostate cancer in uncles Social: , never smoker tobacco, marijuana in the past and occasional use, alcohol 6 pack on weekend, + cocaine use on occasion Allergies: NKDA Meds: labetalol, amlodipine ROS: no fevers or chills, no flank pain, no N/V ---------------------------------------------------------------------------------------------------------------------------------------- Labs: PSA Trend: 3/17/22: 1.0 23: 1.43  Imaging:  MRI ABDOMEN WITHOUT AND WITH CONTRAST 23  FINDINGS: LOWER CHEST: The heart size is normal. There is no pleural effusion. LIVER: The liver is normal in size. No significant fatty infiltration is noted. There is a 1.2 x 0.7 cm focal lesion in segment 2/4A showing low signal intensity on T1-weighted images, high signal intensity on T2-weighted images with peripheral nodular centripetal enhancement diagnostic of a hemangioma. The hepatic and portal veins are patent. GALLBLADDER/BILIARY TREE: There is no biliary tree dilatation. The gallbladder is of normal size without wall thickening, pericholecystic fluid or large stones. PANCREAS: There is no mass or pancreatic ductal dilatation. SPLEEN: The spleen is normal size and signal intensities. There is no mass. ADRENAL GLANDS: The adrenal glands are normal in size. There is no nodule. KIDNEYS/URETERS: The kidneys are normal in size and position. Arising from the medial aspect of the junction of the upper to mid-level of the left kidney is a simple cyst measuring 1 cm corresponding to the cyst seen on the CT study. A few additional tiny cysts are scattered in both kidneys. There is no suspicious solid mass or hydronephrosis. Note that MRI has low sensitivity in detecting small stones. On the prior CT examination, there were bilateral renal stones. Please refer to the CT report. GI TRACT: There is no bowel dilatation or obstruction. BLOOD VESSELS: The aorta and IVC are normal in size. LYMPH NODES: There are no enlarged lymph nodes. ASCITES: There is no ascites. BONES: Intact. There is mild dextroscoliosis of the lower thoracic spine. OTHER: A small umbilical hernia containing fat is present. IMPRESSION: 1. Bilateral renal cysts. The cyst noted on the CT examination represents a simple cyst. No follow-up needed. 2. Small left hepatic hemangioma.  CT non-con [facility] 3/20/23: KIDNEYS: The kidneys are normal in size, shape, and position. There are no renal calculi or hydronephrosis. There is a 5 x 4 mm nonobstructing calculus in the mid right kidney. Density measurement corresponds to 747 Hounsfield units. There is also a 4 x 4 mm nonobstructing calculus in mid right kidney and 2 adjacent 2 mm nonobstructing calculi in the upper right kidney. There is 2 mm nonobstructing calculus in the upper left kidney and 3 mm nonobstructing calculus in the lower left kidney. There is 8mm low-attenuation lesion in the mid left kidney containing few thick septations. For further evaluation, MR examination of the abdomen with and without intravenous gadolinium may be helpful. URETERS: Unremarkable. URINARY BLADDER: Unremarkable. REPRODUCTIVE ORGANS: The prostate gland measures 4.9 x 3.9 x 4.2 cm corresponding to 39 g. ----------------------------------------------------------------------------------------------------------------------------------------

## 2024-01-10 ENCOUNTER — NON-APPOINTMENT (OUTPATIENT)
Age: 59
End: 2024-01-10

## 2024-01-11 LAB
PSA FREE FLD-MCNC: 35 %
PSA FREE SERPL-MCNC: 0.54 NG/ML
PSA SERPL-MCNC: 1.55 NG/ML

## 2024-02-01 DIAGNOSIS — R01.0 BENIGN AND INNOCENT CARDIAC MURMURS: ICD-10-CM

## 2024-02-06 ENCOUNTER — APPOINTMENT (OUTPATIENT)
Dept: HEART AND VASCULAR | Facility: CLINIC | Age: 59
End: 2024-02-06
Payer: COMMERCIAL

## 2024-02-06 ENCOUNTER — NON-APPOINTMENT (OUTPATIENT)
Age: 59
End: 2024-02-06

## 2024-02-06 VITALS
DIASTOLIC BLOOD PRESSURE: 88 MMHG | HEART RATE: 54 BPM | BODY MASS INDEX: 28.04 KG/M2 | SYSTOLIC BLOOD PRESSURE: 130 MMHG | HEIGHT: 68 IN | WEIGHT: 185 LBS

## 2024-02-06 PROCEDURE — 93000 ELECTROCARDIOGRAM COMPLETE: CPT

## 2024-02-06 PROCEDURE — 93306 TTE W/DOPPLER COMPLETE: CPT

## 2024-02-06 PROCEDURE — 99204 OFFICE O/P NEW MOD 45 MIN: CPT

## 2024-02-06 RX ORDER — LABETALOL HYDROCHLORIDE 100 MG/1
100 TABLET, FILM COATED ORAL
Refills: 0 | Status: DISCONTINUED | COMMUNITY
End: 2024-02-06

## 2024-02-08 NOTE — HISTORY OF PRESENT ILLNESS
[FreeTextEntry1] : 2/6/24 Patient is a 59 y/o male with a PMHx of (++) HTN (--) AODM (++) Lipids (--) smoker (--) obesity (--) MILLER () FAM hx of CAD on meds for BP  was referred to CV and given dx of BP and seen by Cardiology and given meds labetolol bid w Norvasc  has had prior mixed compliance with med s no sscp or leo  active at gym no angina no headache

## 2024-02-08 NOTE — DISCUSSION/SUMMARY
[FreeTextEntry1] : Htn baseline ekg and echo WNL will titrate up Norvasc to 5mg  and hold BB  [EKG obtained to assist in diagnosis and management of assessed problem(s)] : EKG obtained to assist in diagnosis and management of assessed problem(s)

## 2024-02-26 ENCOUNTER — APPOINTMENT (OUTPATIENT)
Dept: BARIATRICS | Facility: CLINIC | Age: 59
End: 2024-02-26
Payer: COMMERCIAL

## 2024-02-26 VITALS
HEART RATE: 64 BPM | HEIGHT: 68 IN | OXYGEN SATURATION: 97 % | TEMPERATURE: 97.7 F | DIASTOLIC BLOOD PRESSURE: 82 MMHG | WEIGHT: 181.31 LBS | SYSTOLIC BLOOD PRESSURE: 124 MMHG | BODY MASS INDEX: 27.48 KG/M2

## 2024-02-26 DIAGNOSIS — R59.9 ENLARGED LYMPH NODES, UNSPECIFIED: ICD-10-CM

## 2024-02-26 DIAGNOSIS — R10.30 LOWER ABDOMINAL PAIN, UNSPECIFIED: ICD-10-CM

## 2024-02-26 PROCEDURE — 99214 OFFICE O/P EST MOD 30 MIN: CPT

## 2024-02-26 NOTE — HISTORY OF PRESENT ILLNESS
Alberto   [de-identified] : Pt is a 57 y/o M with pmhx of HTN, nephrolithiasis and renal cyst following urology, who presents today feeling well here for f/u on L groin pain. Pt previously consulted with us for evaluation of this problem with a suspected L inguinal hernia and was found to have an asymptomatic umbilical hernia which we are monitoring. Pt groin pain at the time was more likely attributed to a muscle strain and was advised to f/u here prn. Pt states he continues to experience this pain and now notices a small nodule which he thinks may be the cause of his discomfort. On PE, there is a nodular mobile likely lymph node in the L groin, no ttp. Pt denies any unintentional wt loss, night sweats. Pt further denies any urinary symptmos or uretheral discharge. Will obtain CT A/P to further assess and will refer pt to PT. No other concerns at this time.

## 2024-02-26 NOTE — PLAN
[FreeTextEntry1] : CT A/P to assess L groin pain/finding of possible prominent lymph node on PE refer to PT f/u after CT

## 2024-02-26 NOTE — ASSESSMENT
[FreeTextEntry1] : Pt is a 59 y/o M with pmhx of HTN, nephrolithiasis and renal cyst following urology, who presents today feeling well here for f/u on L groin pain. Pt previously consulted with us for evaluation of this problem with a suspected L inguinal hernia and was found to have an asymptomatic umbilical hernia which we are monitoring. Pt groin pain at the time was more likely attributed to a muscle strain and was advised to f/u here prn. Pt states he continues to experience this pain and now notices a small nodule which he thinks may be the cause of his discomfort. On PE, there is a nodular mobile likely lymph node in the L groin, no ttp. Pt denies any unintentional wt loss, night sweats. Pt further denies any urinary symptmos or uretheral discharge. Will obtain CT A/P to further assess and will refer pt to PT. No other concerns at this time.

## 2024-02-26 NOTE — ADDENDUM
[FreeTextEntry1] : I, Dr. Jorge L Murillo, spent 35 minutes with the patient >50% counseling/coordination of care including, reviewing the patient's history, performing an examination, reviewing relevant labs and radiographic imaging, reviewing PCP and consultant notes, discussion of medical and surgical management of the diagnosis as well as associated risks and benefits, and completing documentation.

## 2024-02-26 NOTE — PHYSICAL EXAM
[Normal] : affect appropriate [de-identified] : reducible non tender small umbilical hernia [de-identified] : 0.5cm nodule likely prominent lymph node in the L groin, no ttp, mobile

## 2024-02-27 ENCOUNTER — LABORATORY RESULT (OUTPATIENT)
Age: 59
End: 2024-02-27

## 2024-02-27 ENCOUNTER — APPOINTMENT (OUTPATIENT)
Dept: HEART AND VASCULAR | Facility: CLINIC | Age: 59
End: 2024-02-27
Payer: COMMERCIAL

## 2024-02-27 VITALS
WEIGHT: 181 LBS | SYSTOLIC BLOOD PRESSURE: 144 MMHG | HEIGHT: 68 IN | DIASTOLIC BLOOD PRESSURE: 82 MMHG | BODY MASS INDEX: 27.43 KG/M2

## 2024-02-27 DIAGNOSIS — I10 ESSENTIAL (PRIMARY) HYPERTENSION: ICD-10-CM

## 2024-02-27 DIAGNOSIS — Z00.00 ENCOUNTER FOR GENERAL ADULT MEDICAL EXAMINATION W/OUT ABNORMAL FINDINGS: ICD-10-CM

## 2024-02-27 PROCEDURE — 99213 OFFICE O/P EST LOW 20 MIN: CPT

## 2024-02-27 NOTE — DISCUSSION/SUMMARY
[Hypertension] : hypertension [Stable] : stable [None] : There are no changes in medication management [Weight Loss] : weight loss [Low Sodium Diet] : low sodium diet [NSAIDs Avoidance] : non-steroidal anti-inflammatory drugs avoidance [Patient] : the patient [FreeTextEntry1] : Htn baseline ekg and echo WNL will titrate up Norvasc to 5mg  and hold BB

## 2024-02-27 NOTE — HISTORY OF PRESENT ILLNESS
[FreeTextEntry1] : 2/6/24 Patient is a 57 y/o male with a PMHx of (++) HTN (--) AODM (++) Lipids (--) smoker (--) obesity (--) MILLER () FAM hx of CAD on meds for BP  was referred to CV and given dx of BP and seen by Cardiology and [facility]  has had prior mixed compliance with med s no sscp or leo  active at gym no angina no headache  2/27/24 Bp at home ranging 123-130 sys

## 2024-02-28 LAB
25(OH)D3 SERPL-MCNC: 36.8 NG/ML
ALBUMIN SERPL ELPH-MCNC: 4.9 G/DL
ALP BLD-CCNC: 71 U/L
ALT SERPL-CCNC: 25 U/L
ANION GAP SERPL CALC-SCNC: 13 MMOL/L
AST SERPL-CCNC: 29 U/L
BASOPHILS # BLD AUTO: 0.03 K/UL
BASOPHILS NFR BLD AUTO: 0.6 %
BILIRUB SERPL-MCNC: 1.2 MG/DL
BUN SERPL-MCNC: 14 MG/DL
CALCIUM SERPL-MCNC: 9.8 MG/DL
CHLORIDE SERPL-SCNC: 101 MMOL/L
CHOLEST SERPL-MCNC: 226 MG/DL
CO2 SERPL-SCNC: 26 MMOL/L
CREAT SERPL-MCNC: 1.14 MG/DL
EGFR: 75 ML/MIN/1.73M2
EOSINOPHIL # BLD AUTO: 0.12 K/UL
EOSINOPHIL NFR BLD AUTO: 2.5 %
ESTIMATED AVERAGE GLUCOSE: 114 MG/DL
FOLATE SERPL-MCNC: 11.3 NG/ML
GLUCOSE SERPL-MCNC: 92 MG/DL
HBA1C MFR BLD HPLC: 5.6 %
HCT VFR BLD CALC: 47.1 %
HDLC SERPL-MCNC: 51 MG/DL
HGB BLD-MCNC: 16 G/DL
IMM GRANULOCYTES NFR BLD AUTO: 0.2 %
LDLC SERPL CALC-MCNC: 154 MG/DL
LYMPHOCYTES # BLD AUTO: 1.01 K/UL
LYMPHOCYTES NFR BLD AUTO: 21.3 %
MAN DIFF?: NORMAL
MCHC RBC-ENTMCNC: 29.5 PG
MCHC RBC-ENTMCNC: 34 GM/DL
MCV RBC AUTO: 86.7 FL
MONOCYTES # BLD AUTO: 0.31 K/UL
MONOCYTES NFR BLD AUTO: 6.5 %
NEUTROPHILS # BLD AUTO: 3.26 K/UL
NEUTROPHILS NFR BLD AUTO: 68.9 %
NONHDLC SERPL-MCNC: 175 MG/DL
PLATELET # BLD AUTO: 294 K/UL
POTASSIUM SERPL-SCNC: 4.3 MMOL/L
PROT SERPL-MCNC: 7.5 G/DL
RBC # BLD: 5.43 M/UL
RBC # FLD: 13.2 %
SODIUM SERPL-SCNC: 140 MMOL/L
T3FREE SERPL-MCNC: 3.13 PG/ML
T3RU NFR SERPL: 1 TBI
T4 FREE SERPL-MCNC: 1.4 NG/DL
T4 SERPL-MCNC: 7.9 UG/DL
TRIGL SERPL-MCNC: 114 MG/DL
TSH SERPL-ACNC: 1.63 UIU/ML
VIT B12 SERPL-MCNC: >2000 PG/ML
WBC # FLD AUTO: 4.74 K/UL

## 2024-03-01 ENCOUNTER — NON-APPOINTMENT (OUTPATIENT)
Age: 59
End: 2024-03-01

## 2024-03-29 ENCOUNTER — NON-APPOINTMENT (OUTPATIENT)
Age: 59
End: 2024-03-29

## 2024-04-16 RX ORDER — AMLODIPINE BESYLATE 5 MG/1
5 TABLET ORAL DAILY
Qty: 90 | Refills: 3 | Status: ACTIVE | COMMUNITY
Start: 1900-01-01 | End: 1900-01-01

## 2024-09-11 ENCOUNTER — APPOINTMENT (OUTPATIENT)
Dept: UROLOGY | Facility: CLINIC | Age: 59
End: 2024-09-11
Payer: COMMERCIAL

## 2024-09-11 VITALS — SYSTOLIC BLOOD PRESSURE: 128 MMHG | DIASTOLIC BLOOD PRESSURE: 86 MMHG | HEART RATE: 82 BPM | TEMPERATURE: 97.6 F

## 2024-09-11 PROCEDURE — 99215 OFFICE O/P EST HI 40 MIN: CPT

## 2024-09-11 PROCEDURE — G2211 COMPLEX E/M VISIT ADD ON: CPT

## 2024-09-11 PROCEDURE — 51798 US URINE CAPACITY MEASURE: CPT

## 2024-09-11 NOTE — ASSESSMENT
[FreeTextEntry1] : 59 year old with nocturia, nephrolithiasis..  LUTS/nocturia: No significant daytime LUTS, improvement in nocturia with behavioral changes. Patient does reports significant snoring and plan for work-up for MILLER but has still not done. Discussed role of MILLER in nocturia. Will continue conservative measures and plan for MILLER eval, which patient said he will scheduel when he is ready but not overly bothered.  Nephrolithiasis: bilateral nephrolithiasis. We again discussed the management of kidney stones. There are several options, including surveillance (no treatment), shock wave lithotripsy, ureteroscopy, and percutaneous stone removal. We discussed detail of each approach at last visit and again today. Patient would like to monitor for now. I explained that there is always a risk that the stone could get bigger in size or become symptomatic (pain, bleeding, urinary tract infections, kidney dysfunction from obstruction). While there is limited data examining which patients will eventually need treatment for stones that are asymptomatic, some studies suggest that 20-50% of patients will eventually seek treatment or pass a stone within 5 years. That percentage increases as the size of the stone increases. Patient expressed understanding but given asymptomatic would still like to monitor - plan for US 6 months. Did rick and saw Jordyn Nowak NP: Emphasis on continuing adequate fluid intake, decreasing dietary sodium and oxalate foods. Increase fruits/vegetables for adequate citrate intake. Again reviewed.  PSA screening: Patient also has family history of prostate cancer and is . PSA 1.55 1/2024, will repeat prior to f/u 6 months.  Plan: - MILLER testing - patient still did not do - wants to hold off for now - PSA 1/2025 - will do prior to appt 3/2025 - stone management: patient would like to monitor for now. In interim emphasis on continuing adequate fluid intake, decreasing dietary sodium and oxalate foods. Increase fruits/vegetables for adequate citrate intake.. We discussed warning signs for which he should seek emergency attention (and let me know) including fevers, chills, N/V/inability to tolerate po, intractable pain - reassured re hematospermia- reports coincided with increasing beets which we discussed is high in oxalate anyway, if does not subside when stops beets will let me know - no further imaging re renal cyst - surveillance US for stones in 6 months - f/u 6 months for US and PSA review

## 2024-09-11 NOTE — HISTORY OF PRESENT ILLNESS
[FreeTextEntry1] : Patient Name: Howard Heath Date of Birth: [birth year] Contact Number: [phone] ------------------------------------------------------------------------------ Date of Initial Visit: 1/10/23 Referring Provider/PCP: Dr. Tera Antonio (f. [phone]) ------------------------------------------------------------------------------ Initial HPI 23:  CC: nocturia  HPI: 57 year old with nocturia. Patient reports over the past year or so had onset of nocturia, gradually increased to 3x/night. Usually in second half of the night. Patient reports his sleep has been poor lately. The initial void of the night will wake him from sleep, but then difficult for him to go back to sleep in general and has multiple other voids. Patient reports heavy snoring. He is being worked-up for a sleep study. No major cardiac issues, just high blood pressure. Patient reports drinks a lot of juice and does this up until bedtime.   During the day patient denies any issues. No frequency or urgency. Good stream. No straining. Feels like empties bladder completely.   Patient reports his PCP did an ultrasound at his PCP last year and was told "seed" in his kidney, and was not concerned.  Patient is unsure if he has had PSA screening in the past. Patient reports family history of prostate cancer - multiple uncles with prostate cancer - treated with surgery, at least one still alive, unsure if other  from unrelated causes.  No issues with erections.  IPSS 3 (nocturia) QOL 3 CAMI 21  PVR 87 ------------------------------------------------------------------------------ Interval History 23:  Renal US 3/26/21: right kidney 6.4mm non obstructing right renal calculus. No masses, hydronephrosis, or cysts bilaterally.  PSA 3/17/22: 1.0  Labs from visit: UA wnl, urine culture no growth, PSA 1.4  Underwent repeat renal US 23: non obstructing right mid renal calculi 0.8cm. PVR 38. Prostate 42cc. Bilateral ureteral jets.  Patient reports he is asymptomatic, no N/v, fevers, chills, flank pain. ------------------------------------------------------------------------------ Interval History 3/29/23:  Non-con CT 3/20/23: 1. Several nonobstructing calculi in the right kidney measuring up to 5 mm and 2 nonobstructing calculi in the left kidney measuring up to 3 mm. 2. Cyst measuring 8 mm in the mid left kidney containing few thick septations. For further evaluation, MR examination of abdomen with and without intravenous gadolinium may be helpful. 3. Mildly enlarged prostate gland. - 39g  Nocturia somewhat improved given patient reports he is on furlough and his patterns have changed, but he has not limited fluid much and has not done MILLER work-up yet, but will do after his vacation.  IPSS 2, mixed CAMI 16 PVR 91 - random bladder scan, patient voided earlier in AM and did not need to void Uroflow - patient voided prior, will do at next visit  Imaging: CT non-con [facility] 3/20/23: KIDNEYS: The kidneys are normal in size, shape, and position. There are no renal calculi or hydronephrosis. There is a 5 x 4 mm nonobstructing calculus in the mid right kidney. Density measurement corresponds to 747 Hounsfield units. There is also a 4 x 4 mm nonobstructing calculus in mid right kidney and 2 adjacent 2 mm nonobstructing calculi in the upper right kidney. There is 2 mm nonobstructing calculus in the upper left kidney and 3 mm nonobstructing calculus in the lower left kidney. There is 8mm low-attenuation lesion in the mid left kidney containing few thick septations. For further evaluation, MR examination of the abdomen with and without intravenous gadolinium may be helpful. URETERS: Unremarkable. URINARY BLADDER: Unremarkable. REPRODUCTIVE ORGANS: The prostate gland measures 4.9 x 3.9 x 4.2 cm corresponding to 39 g. ---------------------------------------------------------------------------------------------------------------------------------------- Interval History (2023):  Nocturia: Patient reports got better when withheld fluid but when doesn't goes up to 3x, not overly bothersome. Daytime urination not an issue, denies any straining. Feels like emptying bladder. Patient was supposed to have MILLER eval with pulmonology but has not followed up.  Underwent MRI for better eval of renal cyst: 1.  Bilateral renal cysts. The cyst noted on the CT examination represents a simple cyst. No follow-up needed. 2.  Small left hepatic hemangioma. (full read below).  Also reports strain in left inguinal region, started after lifting weights.  IPSS 9, QOL 3 CAMI 16 Uroflow Qmax 8.7, but only voided 102.6ml, good curve PVR 0  EXAM:  MRI ABDOMEN WITHOUT AND WITH CONTRAST 23 FINDINGS: LOWER CHEST: The heart size is normal. There is no pleural effusion. LIVER: The liver is normal in size. No significant fatty infiltration is noted. There is a 1.2 x 0.7 cm focal lesion in segment 2/4A showing low signal intensity on T1-weighted images, high signal intensity on T2-weighted images with peripheral nodular centripetal enhancement diagnostic of a hemangioma. The hepatic and portal veins are patent. GALLBLADDER/BILIARY TREE: There is no biliary tree dilatation. The gallbladder is of normal size without wall thickening, pericholecystic fluid or large stones. PANCREAS: There is no mass or pancreatic ductal dilatation.  SPLEEN: The spleen is normal size and signal intensities. There is no mass.   ADRENAL GLANDS: The adrenal glands are normal in size. There is no nodule.  KIDNEYS/URETERS: The kidneys are normal in size and position. Arising from the medial aspect of the junction of the upper to mid-level of the left kidney is a simple cyst measuring 1 cm corresponding to the cyst seen on the CT study. A few additional tiny cysts are scattered in both kidneys. There is no suspicious solid mass or hydronephrosis. Note that MRI has low sensitivity in detecting small stones. On the prior CT examination, there were bilateral renal stones. Please refer to the CT report. GI TRACT: There is no bowel dilatation or obstruction.  BLOOD VESSELS: The aorta and IVC are normal in size. LYMPH NODES: There are no enlarged lymph nodes.   ASCITES: There is no ascites.  BONES: Intact. There is mild dextroscoliosis of the lower thoracic spine. OTHER: A small umbilical hernia containing fat is present.  IMPRESSION: 1.  Bilateral renal cysts. The cyst noted on the CT examination represents a simple cyst. No follow-up needed. 2.  Small left hepatic hemangioma. ---------------------------------------------------------------------------------------------------------------------------------------- Interval History (2024):  Saw Jordyn Nowak NP for litholink review: Results of 24-hour urine analysis (completed 2023) demonstrate: -Adequate urine volume: 2.43 L/day -Elevated urinary sodium: 200 mmol/day -Normal urinary calcium: 111 mg/day -Hyperoxaluria: 44 mg/day -Normal urinary citrate: 478 mg/day -PCR: 1.1 mg/kg/day -Normal urinary uric acid: 0.726 g/day -Urinary pH: 6.958  Patient reports had chronic cough, saw cards started on meds for high BP and will be seeing pulmonologist in near future, but doing better. Patient denies any flank pain, N/V. Reports at cardiologist they did US and they saw stones - he will have report sent here. Given asymptomatic would still like to observe at this time, but considering intervention in future - will reevaluate after next US.  With regards to urination, not currently on meds. Patient reports overall pleased with urination, not overly bothered - nocturia 1-2x/night. No significant frequency or urgency. Still hasn't had MILLER work-up but will discuss with pulmonologist when he sees. Patient also reports has stopped using recreational cocaine.  Did not have PSA drawn  IPSS 6, QOL 3 CAMI 19 ---------------------------------------------------------------------------------------------------------------------------------------- Interval History (2024):  PSA 24: 1.55  Gen surgery notes reviewed - L groin pain and possible enlarged LN - underwent CT 3/22/24: [facility]: bilateral renal calculi including a 3mm nonobstructing stone in left lower pole and 2 stones in right kidney measuring up to 5mm. Small left renal cortical cyst also noted. No hydronephrosis. Enlarged prostate gland measuring 5.3cm with several prostatic calcifications. Urinary bladder unremarkable. No enlarged lymph nodes. No acute abdominopelvic pathology.  MILLER testing - never did. Some nights wakes up once to urinate, other times can be up to 3x. Not bothered. No daytime frequency or urgency  Stones: no flank pain, N/V.  Patient reports noticed mild hematospermia- reddish tint to semen - reports has been having beet juice daily and correlated with change in color.  Renal US today: Right kidney- Again visualized echogenic focus with distal shadow and twinkle artifact in mid pole right kidney measuring 5.5 mm , other echogenic foci 3.2 mm mid pole. 3 mm lower pole Left kidney- An echogenic focus with twinkle artifact measuring 5.5 mm upper pole left kidney, another echogenic focus 3.2 mm lower pole Both kidneys are normal in size and echogenicity without hydronephrosis, or solid masses present.  Of note - upper pole echogenic focus 5.5 on left kidney seen on prior US as well and no CT correlate with CT in interim. Unclear if all stones versus artifact (3 do correlate with CT stones - unclear if one of the 3mm on R new versus artifact).  IPSS 6 QOL 3 CAMI 20 PVR (to ensure adequate emptying): 0 ----------------------------------------------------------------------------------------------------------------------------------------  PMH: HTN PSH: inguinal hernia repair with mesh (open, at age 23), ACL repair, cyst removal Family History: prostate cancer in uncles Social: , never smoker tobacco, marijuana in the past and occasional use, alcohol 6 pack on weekend, + cocaine use on occasion Allergies: NKDA ROS: no fevers or chills, no flank pain, no N/V  ----------------------------------------------------------------------------------------------------------------------------------------   Labs: REJI Trend: 3/17/22: 1.0 23: 1.43 24: 1.55

## 2024-09-11 NOTE — HISTORY OF PRESENT ILLNESS
[FreeTextEntry1] : Patient Name: Howard Heath Date of Birth: [birth year] Contact Number: [phone] ------------------------------------------------------------------------------ Date of Initial Visit: 1/10/23 Referring Provider/PCP: Dr. Tera Antonio (f. [phone]) ------------------------------------------------------------------------------ Initial HPI 23:  CC: nocturia  HPI: 57 year old with nocturia. Patient reports over the past year or so had onset of nocturia, gradually increased to 3x/night. Usually in second half of the night. Patient reports his sleep has been poor lately. The initial void of the night will wake him from sleep, but then difficult for him to go back to sleep in general and has multiple other voids. Patient reports heavy snoring. He is being worked-up for a sleep study. No major cardiac issues, just high blood pressure. Patient reports drinks a lot of juice and does this up until bedtime.   During the day patient denies any issues. No frequency or urgency. Good stream. No straining. Feels like empties bladder completely.   Patient reports his PCP did an ultrasound at his PCP last year and was told "seed" in his kidney, and was not concerned.  Patient is unsure if he has had PSA screening in the past. Patient reports family history of prostate cancer - multiple uncles with prostate cancer - treated with surgery, at least one still alive, unsure if other  from unrelated causes.  No issues with erections.  IPSS 3 (nocturia) QOL 3 CAMI 21  PVR 87 ------------------------------------------------------------------------------ Interval History 23:  Renal US 3/26/21: right kidney 6.4mm non obstructing right renal calculus. No masses, hydronephrosis, or cysts bilaterally.  PSA 3/17/22: 1.0  Labs from visit: UA wnl, urine culture no growth, PSA 1.4  Underwent repeat renal US 23: non obstructing right mid renal calculi 0.8cm. PVR 38. Prostate 42cc. Bilateral ureteral jets.  Patient reports he is asymptomatic, no N/v, fevers, chills, flank pain. ------------------------------------------------------------------------------ Interval History 3/29/23:  Non-con CT 3/20/23: 1. Several nonobstructing calculi in the right kidney measuring up to 5 mm and 2 nonobstructing calculi in the left kidney measuring up to 3 mm. 2. Cyst measuring 8 mm in the mid left kidney containing few thick septations. For further evaluation, MR examination of abdomen with and without intravenous gadolinium may be helpful. 3. Mildly enlarged prostate gland. - 39g  Nocturia somewhat improved given patient reports he is on furlough and his patterns have changed, but he has not limited fluid much and has not done MILLER work-up yet, but will do after his vacation.  IPSS 2, mixed CAMI 16 PVR 91 - random bladder scan, patient voided earlier in AM and did not need to void Uroflow - patient voided prior, will do at next visit  Imaging: CT non-con [facility] 3/20/23: KIDNEYS: The kidneys are normal in size, shape, and position. There are no renal calculi or hydronephrosis. There is a 5 x 4 mm nonobstructing calculus in the mid right kidney. Density measurement corresponds to 747 Hounsfield units. There is also a 4 x 4 mm nonobstructing calculus in mid right kidney and 2 adjacent 2 mm nonobstructing calculi in the upper right kidney. There is 2 mm nonobstructing calculus in the upper left kidney and 3 mm nonobstructing calculus in the lower left kidney. There is 8mm low-attenuation lesion in the mid left kidney containing few thick septations. For further evaluation, MR examination of the abdomen with and without intravenous gadolinium may be helpful. URETERS: Unremarkable. URINARY BLADDER: Unremarkable. REPRODUCTIVE ORGANS: The prostate gland measures 4.9 x 3.9 x 4.2 cm corresponding to 39 g. ---------------------------------------------------------------------------------------------------------------------------------------- Interval History (2023):  Nocturia: Patient reports got better when withheld fluid but when doesn't goes up to 3x, not overly bothersome. Daytime urination not an issue, denies any straining. Feels like emptying bladder. Patient was supposed to have MILLER eval with pulmonology but has not followed up.  Underwent MRI for better eval of renal cyst: 1.  Bilateral renal cysts. The cyst noted on the CT examination represents a simple cyst. No follow-up needed. 2.  Small left hepatic hemangioma. (full read below).  Also reports strain in left inguinal region, started after lifting weights.  IPSS 9, QOL 3 CAMI 16 Uroflow Qmax 8.7, but only voided 102.6ml, good curve PVR 0  EXAM:  MRI ABDOMEN WITHOUT AND WITH CONTRAST 23 FINDINGS: LOWER CHEST: The heart size is normal. There is no pleural effusion. LIVER: The liver is normal in size. No significant fatty infiltration is noted. There is a 1.2 x 0.7 cm focal lesion in segment 2/4A showing low signal intensity on T1-weighted images, high signal intensity on T2-weighted images with peripheral nodular centripetal enhancement diagnostic of a hemangioma. The hepatic and portal veins are patent. GALLBLADDER/BILIARY TREE: There is no biliary tree dilatation. The gallbladder is of normal size without wall thickening, pericholecystic fluid or large stones. PANCREAS: There is no mass or pancreatic ductal dilatation.  SPLEEN: The spleen is normal size and signal intensities. There is no mass.   ADRENAL GLANDS: The adrenal glands are normal in size. There is no nodule.  KIDNEYS/URETERS: The kidneys are normal in size and position. Arising from the medial aspect of the junction of the upper to mid-level of the left kidney is a simple cyst measuring 1 cm corresponding to the cyst seen on the CT study. A few additional tiny cysts are scattered in both kidneys. There is no suspicious solid mass or hydronephrosis. Note that MRI has low sensitivity in detecting small stones. On the prior CT examination, there were bilateral renal stones. Please refer to the CT report. GI TRACT: There is no bowel dilatation or obstruction.  BLOOD VESSELS: The aorta and IVC are normal in size. LYMPH NODES: There are no enlarged lymph nodes.   ASCITES: There is no ascites.  BONES: Intact. There is mild dextroscoliosis of the lower thoracic spine. OTHER: A small umbilical hernia containing fat is present.  IMPRESSION: 1.  Bilateral renal cysts. The cyst noted on the CT examination represents a simple cyst. No follow-up needed. 2.  Small left hepatic hemangioma. ---------------------------------------------------------------------------------------------------------------------------------------- Interval History (2024):  Saw Jordyn Nowak NP for litholink review: Results of 24-hour urine analysis (completed 2023) demonstrate: -Adequate urine volume: 2.43 L/day -Elevated urinary sodium: 200 mmol/day -Normal urinary calcium: 111 mg/day -Hyperoxaluria: 44 mg/day -Normal urinary citrate: 478 mg/day -PCR: 1.1 mg/kg/day -Normal urinary uric acid: 0.726 g/day -Urinary pH: 6.958  Patient reports had chronic cough, saw cards started on meds for high BP and will be seeing pulmonologist in near future, but doing better. Patient denies any flank pain, N/V. Reports at cardiologist they did US and they saw stones - he will have report sent here. Given asymptomatic would still like to observe at this time, but considering intervention in future - will reevaluate after next US.  With regards to urination, not currently on meds. Patient reports overall pleased with urination, not overly bothered - nocturia 1-2x/night. No significant frequency or urgency. Still hasn't had MILLER work-up but will discuss with pulmonologist when he sees. Patient also reports has stopped using recreational cocaine.  Did not have PSA drawn  IPSS 6, QOL 3 CAMI 19 ---------------------------------------------------------------------------------------------------------------------------------------- Interval History (2024):  PSA 24: 1.55  Gen surgery notes reviewed - L groin pain and possible enlarged LN - underwent CT 3/22/24: [facility] kidneys: bilateral renal calculi including a 3mm nonobstructing stone in left lower pole and 2 stones in right kidney measuring up to 5mm. Small left renal cortical cyst also noted. No hydronephrosis. Enlarged prostate gland measuring 5.3cm with several prostatic calcifications. Urinary bladder unremarkable. No enlarged lymph nodes. No acute abdominopelvic pathology.  MILLER testing - never did. Some nights wakes up once to urinate, other times can be up to 3x. Not bothered. No daytime frequency or urgency  Stones: no flank pain, N/V.  Patient reports noticed mild hematospermia- reddish tint to semen - reports has been having beet juice daily and correlated with change in color.  Renal US today: Right kidney- Again visualized echogenic focus with distal shadow and twinkle artifact in mid pole right kidney measuring 5.5 mm , other echogenic foci 3.2 mm mid pole. 3 mm lower pole Left kidney- An echogenic focus with twinkle artifact measuring 5.5 mm upper pole left kidney, another echogenic focus 3.2 mm lower pole Both kidneys are normal in size and echogenicity without hydronephrosis, or solid masses present.  Of note - upper pole echogenic focus 5.5 on left kidney seen on prior US as well and no CT correlate with CT in interim. Unclear if all stones versus artifact (3 do correlate with CT stones - unclear if one of the 3mm on R new versus artifact).  IPSS 6 QOL 3 CAMI 20 PVR (to ensure adequate emptying): 0 ----------------------------------------------------------------------------------------------------------------------------------------  PMH: HTN PSH: inguinal hernia repair with mesh (open, at age 23), ACL repair, cyst removal Family History: prostate cancer in uncles Social: , never smoker tobacco, marijuana in the past and occasional use, alcohol 6 pack on weekend, + cocaine use on occasion Allergies: NKDA ROS: no fevers or chills, no flank pain, no N/V  ----------------------------------------------------------------------------------------------------------------------------------------   Labs: REJI Trend: 3/17/22: 1.0 23: 1.43 24: 1.55

## 2024-09-11 NOTE — LETTER BODY
[Dear  ___] : Dear  [unfilled], [Courtesy Letter:] : I had the pleasure of seeing your patient, [unfilled], in my office today. [Please see my note below.] : Please see my note below. [Consult Closing:] : Thank you very much for allowing me to participate in the care of this patient.  If you have any questions, please do not hesitate to contact me. [Sincerely,] : Sincerely, [FreeTextEntry3] : Jewell Monique MD\par  Director of Robotic Education\par  The [facility]\par  \par  [email or web address]\par  [phone] ([place])\par  [phone]  ([facility])

## 2024-09-11 NOTE — LETTER BODY
[Dear  ___] : Dear  [unfilled], [Courtesy Letter:] : I had the pleasure of seeing your patient, [unfilled], in my office today. [Please see my note below.] : Please see my note below. [Consult Closing:] : Thank you very much for allowing me to participate in the care of this patient.  If you have any questions, please do not hesitate to contact me. [Sincerely,] : Sincerely, [FreeTextEntry3] : Jewell Monique MD\par  Director of Robotic Education\par  The [facility]\par  \par  [email or web address]\par  [phone] ([place])\par  [phone]  ([facility])

## 2024-12-25 PROBLEM — F10.90 ALCOHOL USE: Status: ACTIVE | Noted: 2023-10-16

## 2025-02-10 ENCOUNTER — NON-APPOINTMENT (OUTPATIENT)
Age: 60
End: 2025-02-10

## 2025-02-10 ENCOUNTER — APPOINTMENT (OUTPATIENT)
Dept: HEART AND VASCULAR | Facility: CLINIC | Age: 60
End: 2025-02-10
Payer: COMMERCIAL

## 2025-02-10 VITALS
SYSTOLIC BLOOD PRESSURE: 141 MMHG | WEIGHT: 189 LBS | DIASTOLIC BLOOD PRESSURE: 90 MMHG | BODY MASS INDEX: 28.64 KG/M2 | HEIGHT: 68 IN | HEART RATE: 66 BPM

## 2025-02-10 DIAGNOSIS — Z00.00 ENCOUNTER FOR GENERAL ADULT MEDICAL EXAMINATION W/OUT ABNORMAL FINDINGS: ICD-10-CM

## 2025-02-10 PROCEDURE — 93000 ELECTROCARDIOGRAM COMPLETE: CPT

## 2025-02-10 PROCEDURE — G2211 COMPLEX E/M VISIT ADD ON: CPT

## 2025-02-10 PROCEDURE — 99214 OFFICE O/P EST MOD 30 MIN: CPT

## 2025-02-10 PROCEDURE — 93306 TTE W/DOPPLER COMPLETE: CPT

## 2025-02-11 ENCOUNTER — NON-APPOINTMENT (OUTPATIENT)
Age: 60
End: 2025-02-11

## 2025-02-11 LAB
ALBUMIN SERPL ELPH-MCNC: 4.6 G/DL
ALP BLD-CCNC: 65 U/L
ALT SERPL-CCNC: 28 U/L
ANION GAP SERPL CALC-SCNC: 13 MMOL/L
AST SERPL-CCNC: 31 U/L
BASOPHILS # BLD AUTO: 0.04 K/UL
BASOPHILS NFR BLD AUTO: 1 %
BILIRUB SERPL-MCNC: 0.8 MG/DL
BUN SERPL-MCNC: 12 MG/DL
CALCIUM SERPL-MCNC: 9.4 MG/DL
CHLORIDE SERPL-SCNC: 102 MMOL/L
CHOLEST SERPL-MCNC: 215 MG/DL
CO2 SERPL-SCNC: 24 MMOL/L
CREAT SERPL-MCNC: 1.12 MG/DL
EGFR: 77 ML/MIN/1.73M2
EOSINOPHIL # BLD AUTO: 0.13 K/UL
EOSINOPHIL NFR BLD AUTO: 3.1 %
ESTIMATED AVERAGE GLUCOSE: 117 MG/DL
FOLATE SERPL-MCNC: 12.2 NG/ML
GLUCOSE SERPL-MCNC: 65 MG/DL
HBA1C MFR BLD HPLC: 5.7 %
HCT VFR BLD CALC: 45.6 %
HDLC SERPL-MCNC: 46 MG/DL
HGB BLD-MCNC: 15.6 G/DL
IMM GRANULOCYTES NFR BLD AUTO: 0.2 %
LDLC SERPL CALC-MCNC: 140 MG/DL
LYMPHOCYTES # BLD AUTO: 1.24 K/UL
LYMPHOCYTES NFR BLD AUTO: 29.5 %
MAN DIFF?: NORMAL
MCHC RBC-ENTMCNC: 29.9 PG
MCHC RBC-ENTMCNC: 34.2 G/DL
MCV RBC AUTO: 87.5 FL
MONOCYTES # BLD AUTO: 0.42 K/UL
MONOCYTES NFR BLD AUTO: 10 %
NEUTROPHILS # BLD AUTO: 2.36 K/UL
NEUTROPHILS NFR BLD AUTO: 56.2 %
NONHDLC SERPL-MCNC: 169 MG/DL
PLATELET # BLD AUTO: 232 K/UL
POTASSIUM SERPL-SCNC: 4.4 MMOL/L
PROT SERPL-MCNC: 6.9 G/DL
PSA FREE FLD-MCNC: 27 %
PSA FREE SERPL-MCNC: 0.41 NG/ML
PSA SERPL-MCNC: 1.51 NG/ML
RBC # BLD: 5.21 M/UL
RBC # FLD: 13.4 %
SODIUM SERPL-SCNC: 138 MMOL/L
T3 SERPL-MCNC: 98 NG/DL
T3FREE SERPL-MCNC: 2.92 PG/ML
T4 FREE SERPL-MCNC: 1.3 NG/DL
T4 SERPL-MCNC: 7.9 UG/DL
TRIGL SERPL-MCNC: 161 MG/DL
TSH SERPL-ACNC: 1.62 UIU/ML
VIT B12 SERPL-MCNC: 827 PG/ML
WBC # FLD AUTO: 4.2 K/UL

## 2025-02-12 ENCOUNTER — APPOINTMENT (OUTPATIENT)
Dept: UROLOGY | Facility: CLINIC | Age: 60
End: 2025-02-12
Payer: COMMERCIAL

## 2025-02-12 DIAGNOSIS — R35.1 NOCTURIA: ICD-10-CM

## 2025-02-12 PROCEDURE — 99214 OFFICE O/P EST MOD 30 MIN: CPT

## 2025-02-12 PROCEDURE — G2211 COMPLEX E/M VISIT ADD ON: CPT

## 2025-02-12 PROCEDURE — 76775 US EXAM ABDO BACK WALL LIM: CPT

## 2025-02-13 LAB
APPEARANCE: CLEAR
BACTERIA: NEGATIVE /HPF
BILIRUBIN URINE: NEGATIVE
BLOOD URINE: NEGATIVE
CAST: 0 /LPF
COLOR: YELLOW
EPITHELIAL CELLS: 0 /HPF
GLUCOSE QUALITATIVE U: NEGATIVE MG/DL
KETONES URINE: NEGATIVE MG/DL
LEUKOCYTE ESTERASE URINE: NEGATIVE
MICROSCOPIC-UA: NORMAL
NITRITE URINE: NEGATIVE
PH URINE: 6.5
PROTEIN URINE: NEGATIVE MG/DL
RED BLOOD CELLS URINE: 1 /HPF
SPECIFIC GRAVITY URINE: 1.02
UROBILINOGEN URINE: 0.2 MG/DL
WHITE BLOOD CELLS URINE: 0 /HPF

## 2025-02-14 LAB — BACTERIA UR CULT: NORMAL

## 2025-02-18 ENCOUNTER — APPOINTMENT (OUTPATIENT)
Facility: CLINIC | Age: 60
End: 2025-02-18
Payer: COMMERCIAL

## 2025-02-18 VITALS
HEIGHT: 68 IN | SYSTOLIC BLOOD PRESSURE: 153 MMHG | OXYGEN SATURATION: 97 % | BODY MASS INDEX: 29.1 KG/M2 | RESPIRATION RATE: 18 BRPM | HEART RATE: 63 BPM | TEMPERATURE: 97 F | WEIGHT: 192 LBS | DIASTOLIC BLOOD PRESSURE: 90 MMHG

## 2025-02-18 DIAGNOSIS — G47.30 SLEEP APNEA, UNSPECIFIED: ICD-10-CM

## 2025-02-18 PROCEDURE — 99203 OFFICE O/P NEW LOW 30 MIN: CPT

## 2025-02-19 ENCOUNTER — APPOINTMENT (OUTPATIENT)
Dept: BARIATRICS | Facility: CLINIC | Age: 60
End: 2025-02-19
Payer: COMMERCIAL

## 2025-02-19 VITALS
OXYGEN SATURATION: 98 % | TEMPERATURE: 97.2 F | HEART RATE: 66 BPM | WEIGHT: 188 LBS | BODY MASS INDEX: 28.49 KG/M2 | DIASTOLIC BLOOD PRESSURE: 90 MMHG | HEIGHT: 68 IN | SYSTOLIC BLOOD PRESSURE: 151 MMHG

## 2025-02-19 DIAGNOSIS — R10.30 LOWER ABDOMINAL PAIN, UNSPECIFIED: ICD-10-CM

## 2025-02-19 PROCEDURE — 99214 OFFICE O/P EST MOD 30 MIN: CPT

## 2025-02-26 ENCOUNTER — APPOINTMENT (OUTPATIENT)
Dept: INTERNAL MEDICINE | Facility: CLINIC | Age: 60
End: 2025-02-26

## 2025-03-11 ENCOUNTER — NON-APPOINTMENT (OUTPATIENT)
Age: 60
End: 2025-03-11

## 2025-03-17 ENCOUNTER — APPOINTMENT (OUTPATIENT)
Dept: UROLOGY | Facility: AMBULATORY SURGERY CENTER | Age: 60
End: 2025-03-17

## 2025-03-24 ENCOUNTER — APPOINTMENT (OUTPATIENT)
Dept: PULMONOLOGY | Facility: CLINIC | Age: 60
End: 2025-03-24

## 2025-04-14 ENCOUNTER — RX RENEWAL (OUTPATIENT)
Age: 60
End: 2025-04-14

## 2025-04-28 ENCOUNTER — APPOINTMENT (OUTPATIENT)
Dept: PULMONOLOGY | Facility: CLINIC | Age: 60
End: 2025-04-28

## 2025-05-21 ENCOUNTER — APPOINTMENT (OUTPATIENT)
Dept: HEART AND VASCULAR | Facility: CLINIC | Age: 60
End: 2025-05-21
Payer: COMMERCIAL

## 2025-05-21 ENCOUNTER — NON-APPOINTMENT (OUTPATIENT)
Age: 60
End: 2025-05-21

## 2025-05-21 VITALS
DIASTOLIC BLOOD PRESSURE: 90 MMHG | HEIGHT: 68 IN | HEART RATE: 59 BPM | WEIGHT: 190 LBS | BODY MASS INDEX: 28.79 KG/M2 | SYSTOLIC BLOOD PRESSURE: 138 MMHG

## 2025-05-21 DIAGNOSIS — N20.0 CALCULUS OF KIDNEY: ICD-10-CM

## 2025-05-21 DIAGNOSIS — Z00.00 ENCOUNTER FOR GENERAL ADULT MEDICAL EXAMINATION W/OUT ABNORMAL FINDINGS: ICD-10-CM

## 2025-05-21 DIAGNOSIS — N28.1 CYST OF KIDNEY, ACQUIRED: ICD-10-CM

## 2025-05-21 DIAGNOSIS — R01.0 BENIGN AND INNOCENT CARDIAC MURMURS: ICD-10-CM

## 2025-05-21 DIAGNOSIS — I10 ESSENTIAL (PRIMARY) HYPERTENSION: ICD-10-CM

## 2025-05-21 PROCEDURE — G2211 COMPLEX E/M VISIT ADD ON: CPT

## 2025-05-21 PROCEDURE — 93000 ELECTROCARDIOGRAM COMPLETE: CPT

## 2025-05-21 PROCEDURE — 99214 OFFICE O/P EST MOD 30 MIN: CPT

## 2025-05-22 LAB
ANION GAP SERPL CALC-SCNC: 12 MMOL/L
APTT BLD: 32.6 SEC
BASOPHILS # BLD AUTO: 0.03 K/UL
BASOPHILS NFR BLD AUTO: 0.6 %
BUN SERPL-MCNC: 11 MG/DL
CALCIUM SERPL-MCNC: 9.6 MG/DL
CHLORIDE SERPL-SCNC: 103 MMOL/L
CO2 SERPL-SCNC: 26 MMOL/L
CREAT SERPL-MCNC: 1.2 MG/DL
EGFRCR SERPLBLD CKD-EPI 2021: 70 ML/MIN/1.73M2
EOSINOPHIL # BLD AUTO: 0.17 K/UL
EOSINOPHIL NFR BLD AUTO: 3.5 %
GLUCOSE SERPL-MCNC: 88 MG/DL
HCT VFR BLD CALC: 50.4 %
HGB BLD-MCNC: 16.5 G/DL
IMM GRANULOCYTES NFR BLD AUTO: 0.4 %
INR PPP: 1.05 RATIO
LYMPHOCYTES # BLD AUTO: 1.39 K/UL
LYMPHOCYTES NFR BLD AUTO: 28.3 %
MAN DIFF?: NORMAL
MCHC RBC-ENTMCNC: 29.6 PG
MCHC RBC-ENTMCNC: 32.7 G/DL
MCV RBC AUTO: 90.5 FL
MONOCYTES # BLD AUTO: 0.39 K/UL
MONOCYTES NFR BLD AUTO: 7.9 %
NEUTROPHILS # BLD AUTO: 2.91 K/UL
NEUTROPHILS NFR BLD AUTO: 59.3 %
PLATELET # BLD AUTO: 247 K/UL
POTASSIUM SERPL-SCNC: 4.2 MMOL/L
PT BLD: 12.4 SEC
RBC # BLD: 5.57 M/UL
RBC # FLD: 13.3 %
SODIUM SERPL-SCNC: 141 MMOL/L
WBC # FLD AUTO: 4.91 K/UL

## 2025-05-23 ENCOUNTER — APPOINTMENT (OUTPATIENT)
Dept: SLEEP CENTER | Facility: HOME HEALTH | Age: 60
End: 2025-05-23

## 2025-07-04 NOTE — PHYSICAL EXAM
[Well Developed] : well developed [Well Nourished] : well nourished [No Acute Distress] : no acute distress [Normal Conjunctiva] : normal conjunctiva [Normal Venous Pressure] : normal venous pressure [No Carotid Bruit] : no carotid bruit [Normal S1, S2] : normal S1, S2 [No Murmur] : no murmur [No Rub] : no rub [No Gallop] : no gallop [Clear Lung Fields] : clear lung fields [Good Air Entry] : good air entry [No Respiratory Distress] : no respiratory distress  [Soft] : abdomen soft [Non Tender] : non-tender [No Masses/organomegaly] : no masses/organomegaly Rosendo [Normal Bowel Sounds] : normal bowel sounds [No Edema] : no edema [Normal Gait] : normal gait [No Cyanosis] : no cyanosis [No Clubbing] : no clubbing [No Rash] : no rash [No Varicosities] : no varicosities [No Skin Lesions] : no skin lesions [Moves all extremities] : moves all extremities [No Focal Deficits] : no focal deficits [Normal Speech] : normal speech [Alert and Oriented] : alert and oriented [Normal memory] : normal memory